# Patient Record
Sex: MALE | Race: WHITE | Employment: FULL TIME | URBAN - METROPOLITAN AREA
[De-identification: names, ages, dates, MRNs, and addresses within clinical notes are randomized per-mention and may not be internally consistent; named-entity substitution may affect disease eponyms.]

---

## 2017-02-12 ENCOUNTER — APPOINTMENT (OUTPATIENT)
Dept: GENERAL RADIOLOGY | Facility: HOSPITAL | Age: 57
End: 2017-02-12
Attending: EMERGENCY MEDICINE
Payer: COMMERCIAL

## 2017-02-12 ENCOUNTER — HOSPITAL ENCOUNTER (EMERGENCY)
Facility: HOSPITAL | Age: 57
Discharge: HOME | End: 2017-02-12
Attending: EMERGENCY MEDICINE
Payer: COMMERCIAL

## 2017-02-12 VITALS
WEIGHT: 260 LBS | HEART RATE: 87 BPM | TEMPERATURE: 99 F | DIASTOLIC BLOOD PRESSURE: 86 MMHG | HEIGHT: 72 IN | BODY MASS INDEX: 35.21 KG/M2 | SYSTOLIC BLOOD PRESSURE: 150 MMHG | RESPIRATION RATE: 16 BRPM | OXYGEN SATURATION: 97 %

## 2017-02-12 DIAGNOSIS — S62.639B OPEN FRACTURE OF TUFT OF DISTAL PHALANX OF FINGER, INITIAL ENCOUNTER: Primary | ICD-10-CM

## 2017-02-12 DIAGNOSIS — S61.219A FINGER LACERATION WITH COMPLICATION, INITIAL ENCOUNTER: ICD-10-CM

## 2017-02-12 PROCEDURE — 99283 EMERGENCY DEPT VISIT LOW MDM: CPT

## 2017-02-12 PROCEDURE — 26750 TREAT FINGER FRACTURE EACH: CPT

## 2017-02-12 PROCEDURE — 11760 REPAIR OF NAIL BED: CPT

## 2017-02-12 PROCEDURE — 12001 RPR S/N/AX/GEN/TRNK 2.5CM/<: CPT

## 2017-02-12 PROCEDURE — 99284 EMERGENCY DEPT VISIT MOD MDM: CPT

## 2017-02-12 PROCEDURE — 73130 X-RAY EXAM OF HAND: CPT

## 2017-02-12 PROCEDURE — 90471 IMMUNIZATION ADMIN: CPT

## 2017-02-12 RX ORDER — TETANUS AND DIPHTHERIA TOXOIDS ADSORBED 2; 2 [LF]/.5ML; [LF]/.5ML
0.5 INJECTION INTRAMUSCULAR ONCE
Status: COMPLETED | OUTPATIENT
Start: 2017-02-12 | End: 2017-02-12

## 2017-02-12 RX ORDER — HYDROCODONE BITARTRATE AND ACETAMINOPHEN 5; 325 MG/1; MG/1
1-2 TABLET ORAL EVERY 4 HOURS PRN
Qty: 20 TABLET | Refills: 0 | Status: SHIPPED | OUTPATIENT
Start: 2017-02-12 | End: 2017-02-19

## 2017-02-12 RX ORDER — HYDROCODONE BITARTRATE AND ACETAMINOPHEN 5; 325 MG/1; MG/1
2 TABLET ORAL ONCE
Status: COMPLETED | OUTPATIENT
Start: 2017-02-12 | End: 2017-02-12

## 2017-02-12 RX ORDER — CEPHALEXIN 500 MG/1
500 CAPSULE ORAL 4 TIMES DAILY
Qty: 28 CAPSULE | Refills: 0 | Status: SHIPPED | OUTPATIENT
Start: 2017-02-12 | End: 2017-02-19

## 2017-02-12 RX ORDER — LIDOCAINE HYDROCHLORIDE 10 MG/ML
INJECTION, SOLUTION INFILTRATION; PERINEURAL
Status: COMPLETED
Start: 2017-02-12 | End: 2017-02-12

## 2017-02-12 NOTE — ED INITIAL ASSESSMENT (HPI)
Pt was building a crib for his 6th grandchild and the saw took the wood and cut three fingers bleeding controlled.

## 2017-02-12 NOTE — ED PROVIDER NOTES
Patient Seen in: [facility] Emergency Department    History   Patient presents with:  Trauma (cardiovascular, musculoskeletal)    Stated Complaint: Finger tip amputation    HPI    55-year-old male presents with injury to right hand.   He states that not done/went to ER.     OTHER SURGICAL HISTORY      Comment 2 SURGERIES right KNEE    OTHER SURGICAL HISTORY      Comment right THIGH HEMATOMA REMOVED    OTHER SURGICAL HISTORY      Comment 3 ELBOW SURGERIES    OTHER SURGICAL HISTORY      Comment left ROTA HPI.  Constitutional and vital signs reviewed. All other systems reviewed and negative except as noted above. PSFH elements reviewed from today and agreed except as otherwise stated in HPI.     Physical Exam       ED Triage Vitals   BP 02/12/17 1411 wood, cut three fingers. FINDINGS:  Overlying bandages limit fine detail of the index middle and ring finger. There is soft tissue irregularity at the distal aspect of the index, middle and ring fingers.  There appears to be absence of a portion of the s orthopedics who requests that we close any suturable lacerations, placed the patient on antibiotics, dress the wound. The patient is to follow-up tomorrow with hand surgery.   The patient informs me that he has a hand surgeon he has used in the past and wi

## 2017-07-13 PROCEDURE — 86803 HEPATITIS C AB TEST: CPT | Performed by: INTERNAL MEDICINE

## 2018-09-06 PROBLEM — H35.89 MACULAR RPE MOTTLING: Status: ACTIVE | Noted: 2018-09-06

## 2018-09-06 PROBLEM — H35.361: Status: ACTIVE | Noted: 2018-09-06

## 2018-10-05 ENCOUNTER — APPOINTMENT (OUTPATIENT)
Dept: CT IMAGING | Facility: HOSPITAL | Age: 58
DRG: 390 | End: 2018-10-05
Attending: EMERGENCY MEDICINE
Payer: COMMERCIAL

## 2018-10-05 ENCOUNTER — APPOINTMENT (OUTPATIENT)
Dept: GENERAL RADIOLOGY | Facility: HOSPITAL | Age: 58
DRG: 390 | End: 2018-10-05
Attending: EMERGENCY MEDICINE
Payer: COMMERCIAL

## 2018-10-05 ENCOUNTER — HOSPITAL ENCOUNTER (INPATIENT)
Facility: HOSPITAL | Age: 58
LOS: 3 days | Discharge: HOME | DRG: 390 | End: 2018-10-08
Attending: EMERGENCY MEDICINE | Admitting: HOSPITALIST
Payer: COMMERCIAL

## 2018-10-05 DIAGNOSIS — K56.609 SBO (SMALL BOWEL OBSTRUCTION) (HCC): Primary | ICD-10-CM

## 2018-10-05 PROCEDURE — 96361 HYDRATE IV INFUSION ADD-ON: CPT

## 2018-10-05 PROCEDURE — 93005 ELECTROCARDIOGRAM TRACING: CPT

## 2018-10-05 PROCEDURE — 99285 EMERGENCY DEPT VISIT HI MDM: CPT

## 2018-10-05 PROCEDURE — 83690 ASSAY OF LIPASE: CPT | Performed by: EMERGENCY MEDICINE

## 2018-10-05 PROCEDURE — 96375 TX/PRO/DX INJ NEW DRUG ADDON: CPT

## 2018-10-05 PROCEDURE — 96376 TX/PRO/DX INJ SAME DRUG ADON: CPT

## 2018-10-05 PROCEDURE — 93010 ELECTROCARDIOGRAM REPORT: CPT

## 2018-10-05 PROCEDURE — 96374 THER/PROPH/DIAG INJ IV PUSH: CPT

## 2018-10-05 PROCEDURE — 74177 CT ABD & PELVIS W/CONTRAST: CPT | Performed by: EMERGENCY MEDICINE

## 2018-10-05 PROCEDURE — 71045 X-RAY EXAM CHEST 1 VIEW: CPT | Performed by: EMERGENCY MEDICINE

## 2018-10-05 PROCEDURE — 85025 COMPLETE CBC W/AUTO DIFF WBC: CPT | Performed by: EMERGENCY MEDICINE

## 2018-10-05 PROCEDURE — 80053 COMPREHEN METABOLIC PANEL: CPT | Performed by: EMERGENCY MEDICINE

## 2018-10-05 PROCEDURE — 81001 URINALYSIS AUTO W/SCOPE: CPT | Performed by: EMERGENCY MEDICINE

## 2018-10-05 PROCEDURE — 0D9670Z DRAINAGE OF STOMACH WITH DRAINAGE DEVICE, VIA NATURAL OR ARTIFICIAL OPENING: ICD-10-PCS | Performed by: EMERGENCY MEDICINE

## 2018-10-05 RX ORDER — MORPHINE SULFATE 4 MG/ML
2 INJECTION, SOLUTION INTRAMUSCULAR; INTRAVENOUS EVERY 2 HOUR PRN
Status: DISCONTINUED | OUTPATIENT
Start: 2018-10-05 | End: 2018-10-05

## 2018-10-05 RX ORDER — SODIUM CHLORIDE 9 MG/ML
INJECTION, SOLUTION INTRAVENOUS CONTINUOUS
Status: ACTIVE | OUTPATIENT
Start: 2018-10-05 | End: 2018-10-05

## 2018-10-05 RX ORDER — HYDRALAZINE HYDROCHLORIDE 20 MG/ML
10 INJECTION INTRAMUSCULAR; INTRAVENOUS EVERY 6 HOURS PRN
Status: DISCONTINUED | OUTPATIENT
Start: 2018-10-05 | End: 2018-10-08

## 2018-10-05 RX ORDER — ONDANSETRON 2 MG/ML
4 INJECTION INTRAMUSCULAR; INTRAVENOUS ONCE
Status: COMPLETED | OUTPATIENT
Start: 2018-10-05 | End: 2018-10-05

## 2018-10-05 RX ORDER — ACETAMINOPHEN 10 MG/ML
1000 INJECTION, SOLUTION INTRAVENOUS EVERY 6 HOURS PRN
Status: DISCONTINUED | OUTPATIENT
Start: 2018-10-05 | End: 2018-10-08

## 2018-10-05 RX ORDER — ONDANSETRON 2 MG/ML
4 INJECTION INTRAMUSCULAR; INTRAVENOUS EVERY 6 HOURS PRN
Status: DISCONTINUED | OUTPATIENT
Start: 2018-10-05 | End: 2018-10-05

## 2018-10-05 RX ORDER — MORPHINE SULFATE 4 MG/ML
4 INJECTION, SOLUTION INTRAMUSCULAR; INTRAVENOUS EVERY 2 HOUR PRN
Status: DISCONTINUED | OUTPATIENT
Start: 2018-10-05 | End: 2018-10-08

## 2018-10-05 RX ORDER — MORPHINE SULFATE 4 MG/ML
2 INJECTION, SOLUTION INTRAMUSCULAR; INTRAVENOUS EVERY 2 HOUR PRN
Status: DISCONTINUED | OUTPATIENT
Start: 2018-10-05 | End: 2018-10-08

## 2018-10-05 RX ORDER — LORAZEPAM 2 MG/ML
0.5 INJECTION INTRAMUSCULAR EVERY 6 HOURS PRN
Status: DISCONTINUED | OUTPATIENT
Start: 2018-10-05 | End: 2018-10-08

## 2018-10-05 RX ORDER — SODIUM CHLORIDE 9 MG/ML
INJECTION, SOLUTION INTRAVENOUS CONTINUOUS
Status: DISCONTINUED | OUTPATIENT
Start: 2018-10-05 | End: 2018-10-08

## 2018-10-05 RX ORDER — MORPHINE SULFATE 4 MG/ML
4 INJECTION, SOLUTION INTRAMUSCULAR; INTRAVENOUS EVERY 30 MIN PRN
Status: DISCONTINUED | OUTPATIENT
Start: 2018-10-05 | End: 2018-10-05 | Stop reason: ALTCHOICE

## 2018-10-05 RX ORDER — DIPHENHYDRAMINE HCL 25 MG
25 TABLET ORAL EVERY 8 HOURS PRN
COMMUNITY
End: 2020-08-19 | Stop reason: ALTCHOICE

## 2018-10-05 RX ORDER — CALCIUM CARBONATE 300MG(750)
400 TABLET,CHEWABLE ORAL DAILY
COMMUNITY

## 2018-10-05 RX ORDER — AMLODIPINE BESYLATE 10 MG/1
10 TABLET ORAL DAILY
COMMUNITY
End: 2018-10-11

## 2018-10-05 RX ORDER — LORAZEPAM 2 MG/ML
0.5 INJECTION INTRAMUSCULAR ONCE
Status: COMPLETED | OUTPATIENT
Start: 2018-10-05 | End: 2018-10-05

## 2018-10-05 RX ORDER — ONDANSETRON 2 MG/ML
4 INJECTION INTRAMUSCULAR; INTRAVENOUS EVERY 6 HOURS PRN
Status: DISCONTINUED | OUTPATIENT
Start: 2018-10-05 | End: 2018-10-08

## 2018-10-05 NOTE — ED INITIAL ASSESSMENT (HPI)
Patient arrives from home with c/o diffuse abdominal pain states pain started last night and has become more intense

## 2018-10-05 NOTE — H&P
[facility] Hospitalist H&P       CC: Patient presents with:  Abdomen/Flank Pain (GI/)       PCP: Bernard Blair MD    History of Present Illness:  Patient is a 62year old male with PMH sig for HTN, anxiety, appendectomy as a child presented to ED for evaluation Adry Brand MD at [address] [place] [address]  4/24/2012Mozell Marie W/LITHOTRIPSY      Comment:  Procedure: LITHOTRIPSY HOLMIUM LASER WITH CYSTOSCOPY;                 Surgeon: Adry Brand MD;  Location: [facility] SURGICAL                CE Rfl:    Multiple Vitamins-Minerals (MENS MULTI VITAMIN & MINERAL OR) Take 1 tablet by mouth daily. Disp:  Rfl:    celecoxib 200 MG Oral Cap Take 1 capsule by mouth 2 (two) times daily. Disp:  Rfl: 1   aspirin 81 MG Oral Tab Take 81 mg by mouth daily.  D 10/05/18   0539   WBC  9.4   HGB  17.3*   MCV  87.4   PLT  188.0       Recent Labs   Lab  10/05/18   0539   NA  140   K  4.2   CL  108   CO2  26.0   BUN  15   CREATSERUM  1.10   GLU  146*   CA  9.3       Recent Labs   Lab  10/05/18   0539   ALT  50   AST CONCLUSION:  Imaging findings are consistent with a small bowel obstruction with a transition point within the right paracentral pelvis. Please see above for further details regarding the chronic findings.     Dictated by: Nyla Porter MD on 10/05

## 2018-10-05 NOTE — ED PROVIDER NOTES
Patient Seen in: [facility] Emergency Department    History   Patient presents with:  Abdomen/Flank Pain (GI/)    Stated Complaint: ABD PAIN    HPI    63-year-old male comes the hospital complaint of having difficulty with some abdominal discomfor OR  4/24/2012: CYSTOSCOPY,INSERT URETERAL STENT      Comment:  Procedure: CYSTO, WITH INSERTION OF STENT;  Surgeon:                Rena Dawson MD;  Location: [address] [place] [address]  4/24/2012: CYSTOSCOPY/URETEROSCOPY/STONE EXTRACTION; Right      Comm sometimes    Alcohol use: Yes      Alcohol/week: 0.0 oz      Comment: occasional/holidays    Drug use: No      Review of Systems    Positive for stated complaint: ABD PAIN  Other systems are as noted in HPI. Constitutional and vital signs reviewed.       A WITH PLATELET.   Procedure                               Abnormality         Status                     ---------                               -----------         ------                     CBC W/ DIFFERENTIAL[[id number]]          Abnormal            Final diverticula. ABDOMINAL WALL:  Unremarkable. PELVIC ORGANS:  Decompressed bladder. LYMPH NODES:  Unremarkable. BONES:  Degenerative changes in the spine. OTHER:  None.       CONCLUSION:  Imaging findings are consistent with a small bowel obstruction with a t obstruction) ([facility]  (primary encounter diagnosis)    Disposition:  Admit  10/5/2018  9:40 am    Follow-up:  No follow-up provider specified.       Medications Prescribed:  Current Discharge Medication List        Present on Admission  Date Reviewed: 2/16/20

## 2018-10-05 NOTE — ED NOTES
NG tube placed in left nare to LIS. Patient verbalizing significant anxiety d/t tube placement. He has taken xanax in the past with good result. Reports pain manageable and declines need for pain medication at this time.

## 2018-10-05 NOTE — CM/SW NOTE
10/05/18 1600   CM/SW Screening   Referral Source    Information Source Chart review;Nursing rounds   Patient's Mental Status Alert;Oriented   Patient's [address]   Patient lives with Spouse   Patient Status Prior to Admission

## 2018-10-05 NOTE — PROGRESS NOTES
NURSING ADMISSION NOTE      Patient admitted via Cart  Oriented to room. Safety precautions initiated. Bed in low position. Call light in reach. ADMITTED TO  FROM ER FOR ABD PAIN,C/OB DR. Harika Zelaya. VS STABLE.

## 2018-10-05 NOTE — CONSULTS
[facility]  Report of Consultation    Wisam Buchanan Patient Status:  Emergency    1960 MRN [id number]   Location [address] [facility] Attending Sajan Keenan MD   Hosp Day # 0 PCP Demetrice Avery MD     Reason for [facility] CYSTOSCOPY URETEROSCOPY;  Left      Comment:  Performed by Netta Cabral MD at [facility] OR  4/24/2012: CYSTOSCOPY,INSERT URETERAL STENT      Comment:  Procedure: CYSTO, WITH INSERTION OF STENT;  Surgeon:                Netta Cabral MD;  Location: [facility] Heart Disorder Maternal Grandfather         MI OR STROKE   • Arthritis Mother        Social History:     reports that he quit smoking about 25 years ago. He has a 12.00 pack-year smoking history.  he has never used smokeless tobacco. He reports that he drin diffuse tenderness R>L, no peritonitis, hypoactive BS    LYMPHATIC: no lymphadenopathy    EXTREMITIES: no cyanosis, clubbing or edema    .     Laboratory Data:  Recent Labs   Lab  10/05/18   0539   WBC  9.4   HGB  17.3*   MCV  87.4   PLT  188.0       Recent mesentery. There is mild mesenteric edema. Scattered colonic diverticula. ABDOMINAL WALL:  Unremarkable. PELVIC ORGANS:  Decompressed bladder. LYMPH NODES:  Unremarkable. BONES:  Degenerative changes in the spine. OTHER:  None.       CONCLUSION:  Imaging

## 2018-10-06 ENCOUNTER — APPOINTMENT (OUTPATIENT)
Dept: GENERAL RADIOLOGY | Facility: HOSPITAL | Age: 58
DRG: 390 | End: 2018-10-06
Attending: PHYSICIAN ASSISTANT
Payer: COMMERCIAL

## 2018-10-06 PROCEDURE — 85025 COMPLETE CBC W/AUTO DIFF WBC: CPT | Performed by: HOSPITALIST

## 2018-10-06 PROCEDURE — 74021 RADEX ABDOMEN 3+ VIEWS: CPT | Performed by: PHYSICIAN ASSISTANT

## 2018-10-06 PROCEDURE — 80048 BASIC METABOLIC PNL TOTAL CA: CPT | Performed by: HOSPITALIST

## 2018-10-06 NOTE — PROGRESS NOTES
[facility]  Progress Note    Rachael Jdae Patient Status:  Inpatient    1960 MRN [id number]   [facility] [place] 5NW-A Attending Tish Navarrete MD   Hosp Day # 1 PCP Jaziel Lynch MD     Subjective:  Feeling better. Passing some gas.  X-

## 2018-10-06 NOTE — PLAN OF CARE
Patient/Family Goals    • Patient/Family Long Term Goal Progressing    • Patient/Family Short Term Goal Progressing            Pt is A+Ox4. VSS on RA. Here with SBO. NGT to ROMAN mccallum on LIS. Green/light brown output noted.  NPO. BS+, passing gas, but no BMs as

## 2018-10-06 NOTE — PLAN OF CARE
Received patient awake and oriented, no c/o pain voiced. On room air, breath sounds diminished. NG tube to L nare in place to low intermittent suction with small amount of bile colored drainage. Up to bathroom, steady on feet. NPO status maintained.  To hav

## 2018-10-07 PROCEDURE — 80048 BASIC METABOLIC PNL TOTAL CA: CPT | Performed by: INTERNAL MEDICINE

## 2018-10-07 PROCEDURE — 83735 ASSAY OF MAGNESIUM: CPT | Performed by: INTERNAL MEDICINE

## 2018-10-07 NOTE — PROGRESS NOTES
[facility] Hospitalist Progress Note     [facility]      SUBJECTIVE:  Patient feeling well  Had BM this morning, + flatus  No N/V    OBJECTIVE:  Temp:  [98.1 °F (36.7 °C)-98.9 °F (37.2 °C)] 98.9 °F (37.2 °C)  Pulse:  [70-77] 71  Resp:  [16-18] 18  BP: (131 distended air-filled loops of small bowel in mid abdomen are noted. There cell or air-fluid levels. CALCIFICATIONS:  None significant. CONCLUSION:  Mildly distended air-filled loops of small bowel in the mid abdomen with air-fluid levels are noted. ABDOMINAL WALL:  Unremarkable. PELVIC ORGANS:  Decompressed bladder. LYMPH NODES:  Unremarkable. BONES:  Degenerative changes in the spine. OTHER:  None.       CONCLUSION:  Imaging findings are consistent with a small bowel obstruction with a transition poi sig for HTN, anxiety who presented with SBO.     Abdominal pain 2/2 SBO  - CT imaging independently reviewed and notable for SBO  - apprec gen surgery consultation  - NG tube in place -- possible clamping/removal today per general surgery  - IVF, NPO  - IV

## 2018-10-07 NOTE — PROGRESS NOTES
[facility]  Progress Note    [address] Patient Status:  Inpatient    1960 MRN [id number]   [facility] 5NW-A Attending Jorge Luis Peters MD   Hosp Day # 2 PCP Henry Andrade MD     Subjective:  Several bowel movements    Objective

## 2018-10-07 NOTE — PLAN OF CARE
Received patient awake and oriented. On room air, breath sounds diminished. NG tube to L nare draining large amount of green/brown fluid by low intermittent suction. Medicated with iv Tylenol for c/o headache with relief obtained. NPO status maintained.  Up

## 2018-10-07 NOTE — PLAN OF CARE
GASTROINTESTINAL - ADULT    Patient/Family Goals    • Patient/Family Long Term Goal Progressing    • Patient/Family Short Term Goal Progressing          PROBLEM: SBO    ASSESSMENT: Pt is A&O x4. VSS, afebrile. On RA, denies SOB.  NG tube clamped at 0920 per

## 2018-10-08 VITALS
DIASTOLIC BLOOD PRESSURE: 80 MMHG | HEART RATE: 69 BPM | RESPIRATION RATE: 16 BRPM | WEIGHT: 280 LBS | OXYGEN SATURATION: 98 % | SYSTOLIC BLOOD PRESSURE: 156 MMHG | TEMPERATURE: 98 F | BODY MASS INDEX: 38 KG/M2

## 2018-10-08 NOTE — PROGRESS NOTES
[facility]  Progress Note    [address] Patient Status:  Inpatient    1960 MRN [id number]   [facility] 5NW-A Attending Armaan Saunders, DO   Hosp Day # 3 PCP Cherri Degroot MD     Subjective:    Patient tolerating soft diet t

## 2018-10-08 NOTE — DISCHARGE SUMMARY
General Medicine Discharge Summary     Patient ID:  Ashlee Silverman  62year old  9/29/1960    Admit date: 10/5/2018    Discharge date and time: 10/8/2018 11:30 AM     Attending Physician: Jose Manuel Guthrie AM    CONTINUE these medications which have NOT CHANGED    AmLODIPine Besylate 10 MG Oral Tab  Take 10 mg by mouth daily. , Historical    Sertraline HCl 50 MG Oral Tab  Take 50 mg by mouth daily. , Historical    Magnesium 400 MG Oral Tab  Take 400 mg by mout

## 2018-10-08 NOTE — PLAN OF CARE
Received patient awake and oriented. On room air, breath sounds clear. Offered no c/o pain. To start on low residue diet at breakfast. Up to the bathroom independently, steady on feet.

## 2018-10-08 NOTE — PROGRESS NOTES
NURSING DISCHARGE NOTE    Discharged Home via Ambulatory. Accompanied by Family member  Belongings Taken by patient/family. Pt discharged home with wife. Discharge instructions reviewed with pt. All questions answered. All belongings taken by pt.

## 2018-10-09 NOTE — PAYOR COMM NOTE
--------------  ADMISSION REVIEW     Payor: [address] [place] [address] Road #:  [id number]  Authorization Number: [id number]    Admit date: 10/5/18  Admit time: [address]       Admitting Physician: Art Mcdonald MD  Attending Physician:  Ana COLONOSCOPY; N/A      Comment:  Procedure: COLONOSCOPY, POSSIBLE BIOPSY, POSSIBLE                POLYPECTOMY 42633;  Surgeon: Patel Curtis MD;  Location:                [facility]  7/19/2018: COLONOSCOPY, POSSIBLE BIOPSY, POSSIBLE POLYPECTOMY 79179;   N mild gastritis (neg for H                pylori)  12/18/13: OTHER SURGICAL HISTORY      Comment:  Cystoscopy - Dr. Miya Gutierrez  4/24/2012: X-RAY RETROGRADE PYELOGRAM      Comment:  Procedure: CYSTO, WITH INSERTION OF STENT;  Surgeon:                Edmund Isaacs other components within normal limits   LIPASE - Normal   CBC WITH DIFFERENTIAL WITH PLATELET     EKG  Rate, intervals and axes as noted on EKG Report.   Rate: 68  Rhythm: Sinus Rhythm  Reading: Agreed    Ct Abdomen+pelvis(contrast Only)(cpt=74177)  Result (GI/)     PCP: Shelley Rojas MD    History of Present Illness:  Patient is a 62year old male with PMH sig for HTN, appendectomy as a child presented to ED for evaluation of diffuse mod-severe abdominal pain, cramping in quality, which has been progressive lesions  Lungs: CTA, good effort  CV: nl S1/S2  GI: soft/ND, mild diffuse ttp, +BS  Ext: nonedematous b/ LE  Neuro: no focal deficits  Skin: no rashes/lesions  Psych: normal mood/affect    Diagnostic Data:    CBC/Chem  Recent Labs   Lab  10/05/18   7150 MULTI VITAMIN & MINERAL OR) Take 1 tablet by mouth daily. Disp:  Rfl:    celecoxib 200 MG Oral Cap Take 1 capsule by mouth 2 (two) times daily. Disp:  Rfl: 1   aspirin 81 MG Oral Tab Take 81 mg by mouth daily.  Disp:  Rfl:    Omega-3 Fatty Acids (FISH O

## 2018-10-10 NOTE — PAYOR COMM NOTE
--------------  DISCHARGE REVIEW    Payor: [address] [place] [address] Road #:  [id number]  Authorization Number: [id number]    Admit date: 10/5/18  Admit time:  2947  Discharge Date: 10/8/2018 11:30 AM     Admitting Physician: Mariya Vasquez consultation  - NG T placed - now dc  - IVF, NPO, now tolerating diet     Nausea  - RESOVLED  - IV zofran prn  - NGT in place     HTN  - BP has been better with pain control  - hold home BP med (amlodipine 10mg daily) while NPO, resume home meds when able

## 2019-03-13 PROBLEM — L60.0 ONYCHOCRYPTOSIS: Status: ACTIVE | Noted: 2018-11-11

## 2019-08-12 PROBLEM — M75.122 COMPLETE ROTATOR CUFF TEAR OF LEFT SHOULDER: Status: ACTIVE | Noted: 2019-07-30

## 2019-08-12 PROBLEM — M75.102 ROTATOR CUFF SYNDROME OF LEFT SHOULDER: Status: ACTIVE | Noted: 2019-07-30

## 2020-07-22 PROBLEM — H93.299 ABNORMAL AUDITORY PERCEPTION, UNSPECIFIED LATERALITY: Status: ACTIVE | Noted: 2020-07-22

## 2020-07-22 PROBLEM — H90.3 SENSORY HEARING LOSS, BILATERAL: Status: ACTIVE | Noted: 2020-07-22

## 2020-07-22 PROBLEM — H93.13 BILATERAL TINNITUS: Status: ACTIVE | Noted: 2020-07-22

## 2020-07-23 PROBLEM — Z87.440 HISTORY OF UTI: Status: ACTIVE | Noted: 2020-07-23

## 2020-07-23 PROBLEM — R97.20 ELEVATED PROSTATE SPECIFIC ANTIGEN (PSA): Status: ACTIVE | Noted: 2020-07-23

## 2020-07-23 PROBLEM — N40.1 ENLARGED PROSTATE WITH LOWER URINARY TRACT SYMPTOMS (LUTS): Status: ACTIVE | Noted: 2020-07-23

## 2020-07-23 PROBLEM — N20.0 BILATERAL KIDNEY STONES: Status: ACTIVE | Noted: 2020-07-23

## 2020-07-23 PROBLEM — R36.1 HEMATOSPERMIA: Status: ACTIVE | Noted: 2020-07-23

## 2020-07-23 PROBLEM — R31.0 GROSS HEMATURIA: Status: ACTIVE | Noted: 2020-07-23

## 2020-08-19 PROBLEM — K56.609 SBO (SMALL BOWEL OBSTRUCTION) (HCC): Status: RESOLVED | Noted: 2018-10-05 | Resolved: 2020-08-19

## 2020-08-19 PROBLEM — N28.1 RENAL CYST, ACQUIRED, LEFT: Status: ACTIVE | Noted: 2020-08-19

## 2020-08-19 PROBLEM — N20.0 LEFT RENAL STONE: Status: ACTIVE | Noted: 2020-08-19

## 2021-03-27 ENCOUNTER — APPOINTMENT (OUTPATIENT)
Dept: CT IMAGING | Facility: HOSPITAL | Age: 61
End: 2021-03-27
Attending: EMERGENCY MEDICINE
Payer: COMMERCIAL

## 2021-03-27 ENCOUNTER — HOSPITAL ENCOUNTER (EMERGENCY)
Facility: HOSPITAL | Age: 61
Discharge: HOME | End: 2021-03-28
Attending: EMERGENCY MEDICINE
Payer: COMMERCIAL

## 2021-03-27 DIAGNOSIS — N20.1 URETEROLITHIASIS: Primary | ICD-10-CM

## 2021-03-27 DIAGNOSIS — N23 RENAL COLIC: ICD-10-CM

## 2021-03-27 LAB
ALBUMIN SERPL-MCNC: 4.3 G/DL (ref 3.4–5)
ALBUMIN/GLOB SERPL: 1.2 {RATIO} (ref 1–2)
ALP LIVER SERPL-CCNC: 71 U/L
ALT SERPL-CCNC: 29 U/L
ANION GAP SERPL CALC-SCNC: 8 MMOL/L (ref 0–18)
AST SERPL-CCNC: 27 U/L (ref 15–37)
BASOPHILS # BLD AUTO: 0.05 X10(3) UL (ref 0–0.2)
BASOPHILS NFR BLD AUTO: 0.5 %
BILIRUB SERPL-MCNC: 0.8 MG/DL (ref 0.1–2)
BILIRUB UR QL STRIP.AUTO: NEGATIVE
BUN BLD-MCNC: 27 MG/DL (ref 7–18)
BUN/CREAT SERPL: 24.1 (ref 10–20)
CALCIUM BLD-MCNC: 9.3 MG/DL (ref 8.5–10.1)
CHLORIDE SERPL-SCNC: 107 MMOL/L (ref 98–112)
CLARITY UR REFRACT.AUTO: CLEAR
CO2 SERPL-SCNC: 27 MMOL/L (ref 21–32)
COLOR UR AUTO: YELLOW
CREAT BLD-MCNC: 1.12 MG/DL
DEPRECATED RDW RBC AUTO: 37.8 FL (ref 35.1–46.3)
EOSINOPHIL # BLD AUTO: 0.25 X10(3) UL (ref 0–0.7)
EOSINOPHIL NFR BLD AUTO: 2.6 %
ERYTHROCYTE [DISTWIDTH] IN BLOOD BY AUTOMATED COUNT: 11.9 % (ref 11–15)
GLOBULIN PLAS-MCNC: 3.6 G/DL (ref 2.8–4.4)
GLUCOSE BLD-MCNC: 92 MG/DL (ref 70–99)
GLUCOSE UR STRIP.AUTO-MCNC: NEGATIVE MG/DL
HCT VFR BLD AUTO: 44.2 %
HGB BLD-MCNC: 16.2 G/DL
IMM GRANULOCYTES # BLD AUTO: 0.02 X10(3) UL (ref 0–1)
IMM GRANULOCYTES NFR BLD: 0.2 %
LIPASE SERPL-CCNC: 146 U/L (ref 73–393)
LYMPHOCYTES # BLD AUTO: 1.97 X10(3) UL (ref 1–4)
LYMPHOCYTES NFR BLD AUTO: 20.7 %
M PROTEIN MFR SERPL ELPH: 7.9 G/DL (ref 6.4–8.2)
MCH RBC QN AUTO: 32 PG (ref 26–34)
MCHC RBC AUTO-ENTMCNC: 36.7 G/DL (ref 31–37)
MCV RBC AUTO: 87.2 FL
MONOCYTES # BLD AUTO: 0.76 X10(3) UL (ref 0.1–1)
MONOCYTES NFR BLD AUTO: 8 %
NEUTROPHILS # BLD AUTO: 6.45 X10 (3) UL (ref 1.5–7.7)
NEUTROPHILS # BLD AUTO: 6.45 X10(3) UL (ref 1.5–7.7)
NEUTROPHILS NFR BLD AUTO: 68 %
NITRITE UR QL STRIP.AUTO: POSITIVE
OSMOLALITY SERPL CALC.SUM OF ELEC: 299 MOSM/KG (ref 275–295)
PH UR STRIP.AUTO: 5 [PH] (ref 5–8)
PLATELET # BLD AUTO: 189 10(3)UL (ref 150–450)
POTASSIUM SERPL-SCNC: 3.9 MMOL/L (ref 3.5–5.1)
PROT UR STRIP.AUTO-MCNC: NEGATIVE MG/DL
RBC # BLD AUTO: 5.07 X10(6)UL
RBC #/AREA URNS AUTO: >10 /HPF
SODIUM SERPL-SCNC: 142 MMOL/L (ref 136–145)
SP GR UR STRIP.AUTO: 1.02 (ref 1–1.03)
UROBILINOGEN UR STRIP.AUTO-MCNC: <2 MG/DL
WBC # BLD AUTO: 9.5 X10(3) UL (ref 4–11)

## 2021-03-27 PROCEDURE — 80053 COMPREHEN METABOLIC PANEL: CPT | Performed by: EMERGENCY MEDICINE

## 2021-03-27 PROCEDURE — 99285 EMERGENCY DEPT VISIT HI MDM: CPT

## 2021-03-27 PROCEDURE — 81001 URINALYSIS AUTO W/SCOPE: CPT | Performed by: EMERGENCY MEDICINE

## 2021-03-27 PROCEDURE — 96375 TX/PRO/DX INJ NEW DRUG ADDON: CPT

## 2021-03-27 PROCEDURE — 96376 TX/PRO/DX INJ SAME DRUG ADON: CPT

## 2021-03-27 PROCEDURE — 96361 HYDRATE IV INFUSION ADD-ON: CPT

## 2021-03-27 PROCEDURE — 87088 URINE BACTERIA CULTURE: CPT | Performed by: EMERGENCY MEDICINE

## 2021-03-27 PROCEDURE — 99284 EMERGENCY DEPT VISIT MOD MDM: CPT

## 2021-03-27 PROCEDURE — 87086 URINE CULTURE/COLONY COUNT: CPT | Performed by: EMERGENCY MEDICINE

## 2021-03-27 PROCEDURE — 74176 CT ABD & PELVIS W/O CONTRAST: CPT | Performed by: EMERGENCY MEDICINE

## 2021-03-27 PROCEDURE — 87186 SC STD MICRODIL/AGAR DIL: CPT | Performed by: EMERGENCY MEDICINE

## 2021-03-27 PROCEDURE — 85025 COMPLETE CBC W/AUTO DIFF WBC: CPT | Performed by: EMERGENCY MEDICINE

## 2021-03-27 PROCEDURE — 83690 ASSAY OF LIPASE: CPT | Performed by: EMERGENCY MEDICINE

## 2021-03-27 PROCEDURE — 96365 THER/PROPH/DIAG IV INF INIT: CPT

## 2021-03-27 RX ORDER — ONDANSETRON 4 MG/1
4 TABLET, ORALLY DISINTEGRATING ORAL EVERY 4 HOURS PRN
Qty: 10 TABLET | Refills: 0 | Status: SHIPPED | OUTPATIENT
Start: 2021-03-27 | End: 2021-04-03

## 2021-03-27 RX ORDER — MORPHINE SULFATE 4 MG/ML
4 INJECTION, SOLUTION INTRAMUSCULAR; INTRAVENOUS ONCE
Status: COMPLETED | OUTPATIENT
Start: 2021-03-27 | End: 2021-03-27

## 2021-03-27 RX ORDER — KETOROLAC TROMETHAMINE 30 MG/ML
15 INJECTION, SOLUTION INTRAMUSCULAR; INTRAVENOUS ONCE
Status: COMPLETED | OUTPATIENT
Start: 2021-03-27 | End: 2021-03-27

## 2021-03-27 RX ORDER — ONDANSETRON 2 MG/ML
4 INJECTION INTRAMUSCULAR; INTRAVENOUS ONCE
Status: COMPLETED | OUTPATIENT
Start: 2021-03-27 | End: 2021-03-27

## 2021-03-27 RX ORDER — TAMSULOSIN HYDROCHLORIDE 0.4 MG/1
0.4 CAPSULE ORAL DAILY
Qty: 7 CAPSULE | Refills: 0 | Status: SHIPPED | OUTPATIENT
Start: 2021-03-27 | End: 2021-04-03

## 2021-03-27 RX ORDER — HYDROCODONE BITARTRATE AND ACETAMINOPHEN 5; 325 MG/1; MG/1
1-2 TABLET ORAL EVERY 6 HOURS PRN
Qty: 10 TABLET | Refills: 0 | Status: SHIPPED | OUTPATIENT
Start: 2021-03-27 | End: 2021-04-03

## 2021-03-28 VITALS
TEMPERATURE: 99 F | DIASTOLIC BLOOD PRESSURE: 88 MMHG | HEIGHT: 72 IN | OXYGEN SATURATION: 99 % | WEIGHT: 190 LBS | SYSTOLIC BLOOD PRESSURE: 142 MMHG | RESPIRATION RATE: 18 BRPM | HEART RATE: 64 BPM | BODY MASS INDEX: 25.73 KG/M2

## 2021-03-28 LAB
BILIRUB UR QL STRIP.AUTO: NEGATIVE
CLARITY UR REFRACT.AUTO: CLEAR
COLOR UR AUTO: YELLOW
GLUCOSE UR STRIP.AUTO-MCNC: NEGATIVE MG/DL
NITRITE UR QL STRIP.AUTO: NEGATIVE
PH UR STRIP.AUTO: 5 [PH] (ref 5–8)
PROT UR STRIP.AUTO-MCNC: NEGATIVE MG/DL
RBC #/AREA URNS AUTO: >10 /HPF
SP GR UR STRIP.AUTO: 1.02 (ref 1–1.03)
UROBILINOGEN UR STRIP.AUTO-MCNC: <2 MG/DL
WBC #/AREA URNS AUTO: >50 /HPF

## 2021-03-28 RX ORDER — CEFDINIR 300 MG/1
300 CAPSULE ORAL 2 TIMES DAILY
Qty: 14 CAPSULE | Refills: 0 | Status: SHIPPED | OUTPATIENT
Start: 2021-03-28 | End: 2021-04-04

## 2021-03-28 NOTE — ED INITIAL ASSESSMENT (HPI)
A/O x 4. Patient presents with right sided flank pain since yesterday. Pain radiates to the groin. Reports nausea, denies any vomiting. Denies any hematuria or dysuria.  History of kidney stones

## 2021-03-28 NOTE — ED PROVIDER NOTES
Patient Seen in: [facility] Emergency Department      History   Patient presents with:  Abdomen/Flank Pain  Nausea/Vomiting/Diarrhea    Stated Complaint: Rt Flank Pain w/ Nausea - Hx Kidney Stone    HPI/Subjective:   HPI    22-year-old male presents REMOVED   • OTHER SURGICAL HISTORY      3 ELBOW SURGERIES   • OTHER SURGICAL HISTORY  5/13/10  [facility]    EGD (heartburn, dysphagia): mild gastritis (neg for H pylori)   • OTHER SURGICAL HISTORY  12/18/13    Cystoscopy - Dr. Sandi Plascencia   • [address] Musculoskeletal:         General: No deformity. Cervical back: Normal range of motion and neck supple. Skin:     General: Skin is warm and dry. Capillary Refill: Capillary refill takes less than 2 seconds.    Neurological:      Mental Status: RNDR(NO IV,NO ORAL)(CPT=74176)    Result Date: 3/27/2021  CONCLUSION:  1. 0.5 cm proximal right ureteral calculus with mild obstruction. 2. Additional nonobstructing right renal calculi. 3. Diffuse urinary bladder wall thickening.   Etiologies of acute and R-0    HYDROcodone-acetaminophen 5-325 MG Oral Tab  Take 1-2 tablets by mouth every 6 (six) hours as needed for Pain., Normal, Disp-10 tablet, R-0    tamsulosin (FLOMAX) cap  Take 1 capsule (0.4 mg total) by mouth daily for 7 days. , Normal, Disp-7 capsule,

## 2021-03-30 PROBLEM — M75.102 NONTRAUMATIC TEAR OF LEFT ROTATOR CUFF: Status: ACTIVE | Noted: 2020-05-12

## 2021-03-30 PROBLEM — M65.341 TRIGGER FINGER, RIGHT RING FINGER: Status: ACTIVE | Noted: 2018-05-30

## 2021-04-05 ENCOUNTER — LAB REQUISITION (OUTPATIENT)
Dept: LAB | Facility: HOSPITAL | Age: 61
End: 2021-04-05
Payer: COMMERCIAL

## 2021-04-05 DIAGNOSIS — N20.1 CALCULUS OF URETER: ICD-10-CM

## 2021-04-05 PROCEDURE — 82365 CALCULUS SPECTROSCOPY: CPT | Performed by: UROLOGY

## 2021-04-15 ENCOUNTER — IMMUNIZATION (OUTPATIENT)
Dept: LAB | Age: 61
End: 2021-04-15
Attending: HOSPITALIST
Payer: COMMERCIAL

## 2021-04-15 DIAGNOSIS — Z23 NEED FOR VACCINATION: Primary | ICD-10-CM

## 2021-04-15 PROCEDURE — 0001A SARSCOV2 VAC 30MCG/0.3ML IM: CPT

## 2021-05-06 ENCOUNTER — IMMUNIZATION (OUTPATIENT)
Dept: LAB | Age: 61
End: 2021-05-06
Attending: HOSPITALIST
Payer: COMMERCIAL

## 2021-05-06 DIAGNOSIS — Z23 NEED FOR VACCINATION: Primary | ICD-10-CM

## 2021-05-06 PROCEDURE — 0002A SARSCOV2 VAC 30MCG/0.3ML IM: CPT

## 2024-10-30 NOTE — ED NOTES
Patient report called to Karina, in PMU. Patient with NG tube in place, VS stable, denies need for pain intervention at this time. [FreeTextEntry1] :  I, Leslie Funesd, acted solely as a scribe for Dr. Basilio Luna on this date 10/30/2024 .  All medical records entries made by the Scribe were at my Dr. Basilio Luna direction and personally dictated by me on 10/30/2024. I have reviewed the chart and agree that the record accurately reflects my personal performance of the history, physical exam, assessment and plan. I have also personally directed, reviewed, and agreed with the chart.

## 2025-03-29 ENCOUNTER — HOSPITAL ENCOUNTER (EMERGENCY)
Facility: HOSPITAL | Age: 65
Discharge: HOME | End: 2025-03-29
Attending: EMERGENCY MEDICINE
Payer: COMMERCIAL

## 2025-03-29 ENCOUNTER — APPOINTMENT (OUTPATIENT)
Dept: CT IMAGING | Facility: HOSPITAL | Age: 65
End: 2025-03-29
Attending: EMERGENCY MEDICINE
Payer: COMMERCIAL

## 2025-03-29 VITALS
OXYGEN SATURATION: 98 % | TEMPERATURE: 98 F | RESPIRATION RATE: 18 BRPM | DIASTOLIC BLOOD PRESSURE: 73 MMHG | SYSTOLIC BLOOD PRESSURE: 108 MMHG | HEART RATE: 53 BPM

## 2025-03-29 DIAGNOSIS — N17.9 AKI (ACUTE KIDNEY INJURY): ICD-10-CM

## 2025-03-29 DIAGNOSIS — R10.9 FLANK PAIN: Primary | ICD-10-CM

## 2025-03-29 LAB
ALBUMIN SERPL-MCNC: 4.7 G/DL (ref 3.2–4.8)
ALBUMIN/GLOB SERPL: 1.8 {RATIO} (ref 1–2)
ALP LIVER SERPL-CCNC: 58 U/L
ALT SERPL-CCNC: 22 U/L
ANION GAP SERPL CALC-SCNC: 12 MMOL/L (ref 0–18)
AST SERPL-CCNC: 29 U/L (ref ?–34)
BASOPHILS # BLD AUTO: 0.03 X10(3) UL (ref 0–0.2)
BASOPHILS NFR BLD AUTO: 0.2 %
BILIRUB SERPL-MCNC: 1.7 MG/DL (ref 0.2–1.1)
BILIRUB UR QL CFM: NEGATIVE
BUN BLD-MCNC: 24 MG/DL (ref 9–23)
CALCIUM BLD-MCNC: 9.5 MG/DL (ref 8.7–10.6)
CHLORIDE SERPL-SCNC: 105 MMOL/L (ref 98–112)
CO2 SERPL-SCNC: 23 MMOL/L (ref 21–32)
CREAT BLD-MCNC: 1.63 MG/DL
EGFRCR SERPLBLD CKD-EPI 2021: 47 ML/MIN/1.73M2 (ref 60–?)
EOSINOPHIL # BLD AUTO: 0 X10(3) UL (ref 0–0.7)
EOSINOPHIL NFR BLD AUTO: 0 %
ERYTHROCYTE [DISTWIDTH] IN BLOOD BY AUTOMATED COUNT: 11.8 %
GLOBULIN PLAS-MCNC: 2.6 G/DL (ref 2–3.5)
GLUCOSE BLD-MCNC: 133 MG/DL (ref 70–99)
HCT VFR BLD AUTO: 42.3 %
HGB BLD-MCNC: 15.1 G/DL
IMM GRANULOCYTES # BLD AUTO: 0.07 X10(3) UL (ref 0–1)
IMM GRANULOCYTES NFR BLD: 0.5 %
LYMPHOCYTES # BLD AUTO: 0.45 X10(3) UL (ref 1–4)
LYMPHOCYTES NFR BLD AUTO: 3.4 %
MCH RBC QN AUTO: 31.4 PG (ref 26–34)
MCHC RBC AUTO-ENTMCNC: 35.7 G/DL (ref 31–37)
MCV RBC AUTO: 87.9 FL
MONOCYTES # BLD AUTO: 1.22 X10(3) UL (ref 0.1–1)
MONOCYTES NFR BLD AUTO: 9.1 %
NEUTROPHILS # BLD AUTO: 11.59 X10 (3) UL (ref 1.5–7.7)
NEUTROPHILS # BLD AUTO: 11.59 X10(3) UL (ref 1.5–7.7)
NEUTROPHILS NFR BLD AUTO: 86.8 %
OSMOLALITY SERPL CALC.SUM OF ELEC: 296 MOSM/KG (ref 275–295)
PLATELET # BLD AUTO: 160 10(3)UL (ref 150–450)
POTASSIUM SERPL-SCNC: 4.2 MMOL/L (ref 3.5–5.1)
PROT SERPL-MCNC: 7.3 G/DL (ref 5.7–8.2)
RBC # BLD AUTO: 4.81 X10(6)UL
RBC #/AREA URNS AUTO: >10 /HPF
SODIUM SERPL-SCNC: 140 MMOL/L (ref 136–145)
SP GR UR REFRACTOMETRY: 1.01 (ref 1–1.03)
WBC # BLD AUTO: 13.4 X10(3) UL (ref 4–11)
WBC #/AREA URNS AUTO: >50 /HPF

## 2025-03-29 PROCEDURE — 74176 CT ABD & PELVIS W/O CONTRAST: CPT | Performed by: EMERGENCY MEDICINE

## 2025-03-29 PROCEDURE — 85025 COMPLETE CBC W/AUTO DIFF WBC: CPT | Performed by: EMERGENCY MEDICINE

## 2025-03-29 PROCEDURE — 96365 THER/PROPH/DIAG IV INF INIT: CPT

## 2025-03-29 PROCEDURE — 81001 URINALYSIS AUTO W/SCOPE: CPT | Performed by: EMERGENCY MEDICINE

## 2025-03-29 PROCEDURE — 87086 URINE CULTURE/COLONY COUNT: CPT | Performed by: EMERGENCY MEDICINE

## 2025-03-29 PROCEDURE — 96361 HYDRATE IV INFUSION ADD-ON: CPT

## 2025-03-29 PROCEDURE — 99285 EMERGENCY DEPT VISIT HI MDM: CPT

## 2025-03-29 PROCEDURE — 96375 TX/PRO/DX INJ NEW DRUG ADDON: CPT

## 2025-03-29 PROCEDURE — 80053 COMPREHEN METABOLIC PANEL: CPT | Performed by: EMERGENCY MEDICINE

## 2025-03-29 PROCEDURE — 99284 EMERGENCY DEPT VISIT MOD MDM: CPT

## 2025-03-29 RX ORDER — HYDROCODONE BITARTRATE AND ACETAMINOPHEN 5; 325 MG/1; MG/1
2 TABLET ORAL ONCE
Status: COMPLETED | OUTPATIENT
Start: 2025-03-29 | End: 2025-03-29

## 2025-03-29 RX ORDER — KETOROLAC TROMETHAMINE 15 MG/ML
15 INJECTION, SOLUTION INTRAMUSCULAR; INTRAVENOUS ONCE
Status: COMPLETED | OUTPATIENT
Start: 2025-03-29 | End: 2025-03-29

## 2025-03-29 RX ORDER — HYDROCODONE BITARTRATE AND ACETAMINOPHEN 5; 325 MG/1; MG/1
1-2 TABLET ORAL EVERY 6 HOURS PRN
Qty: 12 TABLET | Refills: 0 | Status: SHIPPED | OUTPATIENT
Start: 2025-03-29 | End: 2025-04-05

## 2025-03-29 RX ORDER — CEFDINIR 300 MG/1
300 CAPSULE ORAL 2 TIMES DAILY
Qty: 14 CAPSULE | Refills: 0 | Status: SHIPPED | OUTPATIENT
Start: 2025-03-29 | End: 2025-04-05

## 2025-03-29 NOTE — ED QUICK NOTES
Rounding Completed    Plan of Care reviewed. Waiting for ct and blood result comfort measures .  Elimination needs assessed.  Provided toradol not working will speak with md .    Bed is locked and in lowest position. Call light within reach.

## 2025-03-29 NOTE — ED PROVIDER NOTES
Patient Seen in: [facility] Emergency Department      History     Chief Complaint   Patient presents with    Fever     Had stent put in yesterday now has a fever of 103 took tylenol at home before coming in fever is now 101     Stated Complaint: fever    Subjective:   HPI    This is a 64-year-old gentleman here for evaluation of right flank pain.  He is postop day 1 from right-sided lithotripsy, right-sided ureteral stent placement, states went home last night, pain was controlled as he did receive pain medications however later in the evening developed severe right flank pain, states had a fever to 103, 1 episode nonbloody nonbilious vomiting states secondary to pain.  States continued to have pain all night.  Did take Tylenol again this morning about 30 minutes prior to coming to the emergency department.  States pain mainly with urinating.  Would like the stent removed.        Objective:     Past Medical History:    Anxiety state, unspecified    Arthritis    BACK PAIN    Calculus of kidney    left    Cubital tunnel syndrome on left    Depression    HYPERTENSION    SBO (small bowel obstruction) (HCC)    Unspecified essential hypertension              Past Surgical History:   Procedure Laterality Date    Appendectomy  5TH GRADE    Colonoscopy  5/13/10  [facility]    Screenin polyps incl 1.3cm polyp (adenoma) in sigmoid; next colonoscopy in     Colonoscopy  13  [facility] ([facility])    Hx polyps: sig diverticulosis, int hemorrhoids; next in 5 yrs    Colonoscopy N/A 2018    Procedure: COLONOSCOPY, POSSIBLE BIOPSY, POSSIBLE POLYPECTOMY 44367;  Surgeon: Robi Sorto MD;  Location: [facility]    Cystoscopy,insert ureteral stent  2012    Procedure: CYSTO, WITH INSERTION OF STENT;  Surgeon: Nabil Rojas MD;  Location: [facility]    Cystouretero w/lithotripsy  2012    Procedure: LITHOTRIPSY HOLMIUM LASER WITH CYSTOSCOPY;  Surgeon: Nabil Rojas MD;  Location: [facility]     Cystouretro w/stone remove  2012    Procedure: CYSTOSCOPY/URETEROSCOPY/STONE EXTRACTION;  Surgeon: Nabil Rojas MD;  [facility]: [facility] [place] [facility]    Other surgical history  13    Cystoscopy - Dr. Rojas/pt states cysto not done/went to ER.    Other surgical history      2 SURGERIES right KNEE    Other surgical history      right THIGH HEMATOMA REMOVED    Other surgical history      3 ELBOW SURGERIES    Other surgical history  5/13/10  [facility]    EGD (heartburn, dysphagia): mild gastritis (neg for H pylori)    Other surgical history  13    Cystoscopy - Dr. Rojas    Other surgical history      L ROTATOR CUFF SURGERY, RIGHT HAND TRIGGER FINGER SURGERY 2018    X-ray retrograde pyelogram  2012    Procedure: CYSTO, WITH INSERTION OF STENT;  Surgeon: Nabil Rojas MD;  Location: [facility] [place] [facility]                Social History     Socioeconomic History    Marital status:    Tobacco Use    Smoking status: Former     Current packs/day: 0.00     Average packs/day: 1 pack/day for 12.0 years (12.0 ttl pk-yrs)     Types: Cigarettes     Start date: 1981     Quit date: 1993     Years since quittin.2    Smokeless tobacco: Never    Tobacco comments:     cigars sometimes   Vaping Use    Vaping status: Never Used   Substance and Sexual Activity    Alcohol use: Yes     Alcohol/week: 0.0 standard drinks of alcohol     Comment: occasional/holidays    Drug use: No                  Physical Exam     ED Triage Vitals [25 0850]   /81   Pulse 89   Resp 18   Temp 98.1 °F (36.7 °C)   Temp src Temporal   SpO2 94 %   O2 Device        Current Vitals:   Vital Signs  BP: 108/73  Pulse: 53  Resp: 18  Temp: 98.1 °F (36.7 °C)  Temp src: Temporal  MAP (mmHg): 84    Oxygen Therapy  SpO2: 98 %        Physical Exam      Physical Exam  Vitals signs and nursing note reviewed.   General:  Patient laying supine in the bed in no acute distress  Head: Normocephalic and atraumatic.   HEENT:   Mucous membranes are moist.   Cardiovascular:  Normal rate and regular rhythm.  No Edema  Pulmonary:  Pulmonary effort is normal.  Normal breath sounds. No wheezing, rhonchi or rales.   Abdominal: Soft nontender nondistended, normal bowel sounds, no guarding no rebound tenderness  Skin: Warm and dry  Neurological: Awake alert, speech is normal        ED Course     Labs Reviewed   CBC WITH DIFFERENTIAL WITH PLATELET - Abnormal; Notable for the following components:       Result Value    WBC 13.4 (*)     Neutrophil Absolute Prelim 11.59 (*)     Neutrophil Absolute 11.59 (*)     Lymphocyte Absolute 0.45 (*)     Monocyte Absolute 1.22 (*)     All other components within normal limits   COMP METABOLIC PANEL (14) - Abnormal; Notable for the following components:    Glucose 133 (*)     BUN 24 (*)     Creatinine 1.63 (*)     Calculated Osmolality 296 (*)     eGFR-Cr 47 (*)     Bilirubin, Total 1.7 (*)     All other components within normal limits   URINALYSIS WITH CULTURE REFLEX - Abnormal; Notable for the following components:    Urine Color Dark-Orange (*)     Clarity Urine Ex.Turbid (*)     WBC Urine >50 (*)     RBC Urine >10 (*)     Bacteria Urine 2+ (*)     All other components within normal limits   ICTOTEST - Normal   SPECIFIC GRAVITY, URINE - Normal   RAINBOW DRAW LAVENDER   RAINBOW DRAW LIGHT GREEN   URINE CULTURE, ROUTINE       ED Course as of 03/29/25 1627  ------------------------------------------------------------  Time: 03/29 1127  Comment: Spoke with Dr. Clayton from neurology, is reassured with patient's white count of 13, CT of the abdomen pelvis showing no acute abnormalities.  Given reported fevers at home does recommend antibiotics will give dose of ceftriaxone here, discharged home on cefdinir, recommends keeping stent in place given reported fevers.       CT ABDOMEN+PELVIS KIDNEYSTONE 2D RNDR(NO IV,NO ORAL)(CPT=74176)    Result Date: 3/29/2025  PROCEDURE:  CT ABDOMEN+PELVIS KIDNEYSTONE 2D RNDR(NO  IV,NO ORAL)(CPT=74176)  COMPARISON:  [facility] , CT, CT ABDOMEN+PELVIS(CONTRAST ONLY)(CPT=74177), 10/05/2018, 6:41 AM.  [facility] , CT, CT ABDOMEN/PELVIS KIDNEYSTONE WITH 3D (CPT=74176/41575), 12/17/2013, 12:06 PM.  [facility] , CT, CT ABDOMEN+PELVIS KIDNEYSTONE 2D RNDR(NO IV,NO ORAL)(CPT=74176), 3/27/2021, 10:57 PM.  INDICATIONS:  R flank pain fever, yesterday had R ureteral stent and lithotripsy  TECHNIQUE:  Unenhanced multislice CT scanning from above the kidneys to below the urinary bladder.  2D rendering are generated on the CT scanner workstation to localize potential stones in the cranio-caudal plane.  Dose reduction techniques were used. Dose information is transmitted to the ACR (American College of Radiology) NRDR (National Radiology Data Registry) which includes the Dose Index Registry.  PATIENT STATED HISTORY: (As transcribed by Technologist)  Patient presents for fever and right flank pain. Lithotripsy yesterday for 9mm stone, right ureteral stent.    FINDINGS:  KIDNEYS:  The left kidney is unremarkable.  A right ureteral stent is noted the proximal and distal aspects are in good position.  There is decrease in the right-sided hydronephrosis.  There are multiple calcifications throughout the right collecting system ranging in size from 2 mm up to the largest aggregate in the lower pole measuring 9 mm.  There is some right-sided perinephric stranding which is minimally increased along the mid to lower pole laterally.  There is perinephric stranding associated  with the right ureteral stent.  There are multiple small 2-2.5 mm calculi within the posterior bladder.  There is mild diffuse bladder wall thickening.  BLADDER:  No mass, calculus or significant wall thickening. ADRENALS:  No mass or enlargement.  LIVER:  No enlargement, atrophy, abnormal density, or significant focal lesion.  BILIARY:  No visible dilatation or calcification.  PANCREAS:  No lesion, fluid collection, ductal dilatation, or atrophy.  SPLEEN:   No enlargement or focal lesion.  AORTA/VASCULAR:  No aneurysm.  RETROPERITONEUM:  No mass or adenopathy.  BOWEL/MESENTERY:  No obstruction.  No free air.  Mild ileus.  Moderate amount of retained stool seen throughout the colon.  Small concretions near the base of the cecum without appendicitis. ABDOMINAL WALL:  No mass or hernia.  BONES:  Stable moderate multiple level degenerative disc disease throughout the spine most severe L3-4 through L5-S1. PELVIC ORGANS:  Mild degenerative change of the hip joints and SI joints. LUNG BASES:  No visible pulmonary or pleural disease.  OTHER:  Negative.             CONCLUSION:  1. Patient is status post right-sided lithotripsy.  Right ureteral stent is noted in good position.  There are multiple calcifications throughout the right kidney with multiple small calcifications within the posterior bladder.  Decrease in the right-sided hydronephrosis.  Slight increase in the perinephric stranding along the inferior and posterior aspect of the right kidney most likely related to recent lithotripsy.  No significant hematoma.  Mild ileus.     LOCATION:  [facility]   Dictated by (CST): Dallas Wagner MD on 3/29/2025 at 10:56 AM     Finalized by (CST): Dallas Wagner MD on 3/29/2025 at 11:06 AM             MDM      This is a 64-year-old gentleman here for evaluation of right flank pain discomfort urination, had lithotripsy and ureteral stent placement yesterday.  Differential includes migration of the stent, urinary tract infection, typical stent associated discomfort.  Will check CBC CMP urinalysis obtain CT abdomen pelvis to evaluate stent position evaluate for any potential complications.    I independently viewed and interpreted the following imaging: CT abdomen pelvis shows ureteral stent in good position    Discussed with urology, patient received 1 g ceftriaxone here be discharged home on 7-day course of cefdinir will follow-up as scheduled in urology clinic this coming  Thursday.  GUADALUPE noted creatinine to 1.6 from 0.9 baseline, patient did receive IV fluids here.  Hydronephrosis on CT scan improved from prior.  Will keep stent in place for now, patient agreeable will also prescribe Norco for pain control, return precautions discussed with patient he is in agreement with plan.          Medical Decision Making      Disposition and Plan     Clinical Impression:  1. Flank pain    2. GUADALUPE (acute kidney injury)         Disposition:  Discharge  3/29/2025  1:24 pm    Follow-up:  Sebastian Tineo MD  [address]  [address]  [facility] [address]  [phone]    Follow up      Ronnie Arias MD  [address]  [address]  [facility] [id number] [phone]    Follow up  Follow-up at your upcoming urology appointment this Thursday return to ER symptoms worsen or change or any other new concerns          Medications Prescribed:  Discharge Medication List as of 3/29/2025  1:27 PM        START taking these medications    Details   cefdinir 300 MG Oral Cap Take 1 capsule (300 mg total) by mouth 2 (two) times daily for 7 days., Normal, Disp-14 capsule, R-0      HYDROcodone-acetaminophen 5-325 MG Oral Tab Take 1-2 tablets by mouth every 6 (six) hours as needed for Pain (do not take this medication prior to drinking alcohol or driving as this medication can impair your senses.). Do not drink alcohol or drive while taking this medication as it can impair yo ur senses., Normal, Disp-12 tablet, R-0      Naloxone HCl 4 MG/0.1ML Nasal Liquid 4 mg by Intranasal route as needed (May repeat as needed in other nostril if symptoms persist). If patient remains unresponsive, repeat dose in other nostril 2-5 minutes after first dose., Normal, Disp-1 kit, R-0                 Supplementary Documentation:

## 2025-03-29 NOTE — ED QUICK NOTES
Rounding Completed    Plan of Care reviewed. Waiting for ivf to infuse.  Elimination needs assessed.  Provided comfort measures.    Bed is locked and in lowest position. Call light within reach.

## (undated) NOTE — ED AVS SNAPSHOT
[facility] Emergency Department    [place]  [address] [address]    Phone:  [phone]    Fax:  [phone]           Jimmy Conteh   MRN: [id number]    Department:  [facility] Emergency Department   Date of Visit:  2/ IF THERE IS ANY CHANGE OR WORSENING OF YOUR CONDITION, CALL YOUR PRIMARY CARE PHYSICIAN AT ONCE OR RETURN IMMEDIATELY TO THE EMERGENCY DEPARTMENT.     If you have been prescribed any medication(s), please fill your prescription right away and begin taking t

## (undated) NOTE — ED AVS SNAPSHOT
[facility] Emergency Department    [place]  [address] [address]    Phone:  [phone]    Fax:  [phone]           Jimmy Conteh   MRN: [id number]    Department:  [facility] Emergency Department   Date of Visit:  2/ FRACTURE, FINGER, OPEN (ENGLISH)      Disclosure     Insurance plans vary and the physician(s) referred by the ER may not be covered by your plan. Please contact your insurance company to determine coverage for follow-up care and referrals.     Og prescription right away and begin taking the medication(s) as directed    If the emergency physician has read X-rays, these will be re-interpreted by a radiologist.  If there is a significant change in your reading, you will be contacted.  Please make sure Medicaid plans. To get signed up and covered, please call [phone] and ask to get set up for an insurance coverage that is in-network with [place] [address].         Imaging Results         XR HAND (MIN 3 VIEWS), RIGHT (CPT=73130) (Final result) discharge instructions in [facility] by going to Visits < Admission Summaries. If you've been to the Emergency Department or your doctor's office, you can view your past visit information in [facility] by going to Visits < Visit Summaries. VTM questions?